# Patient Record
Sex: FEMALE | Race: WHITE | NOT HISPANIC OR LATINO | URBAN - METROPOLITAN AREA
[De-identification: names, ages, dates, MRNs, and addresses within clinical notes are randomized per-mention and may not be internally consistent; named-entity substitution may affect disease eponyms.]

---

## 2017-06-13 NOTE — PATIENT DISCUSSION
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 PRESCRIBE WARM COMPRESSES AND EYELID SCRUBS QD-BID, ARTIFICIAL TEARS BID-QID AND THE DAILY INTAKE OF OMEGA-3 FATTY ACIDS.

## 2017-06-13 NOTE — PATIENT DISCUSSION
Continue: PreserVision AREDS 2 (vit c,o-zh-njxkj-lutein-zeaxan): capsule: 157-338-77-4 mg-unit-mg-mg 1 capsule once a day by mouth

## 2017-06-13 NOTE — PATIENT DISCUSSION
CATARACT, OU - VISUALLY SIGNIFICANT. SCHEDULE PHACO WITH IOL OU IF VISUAL SYMPTOMS PERSIST. GLASSES RX GIVEN TO FILL IF DESIRES IN THE EVENT PATIENT DOES NOT PROCEED WITH SURGERY.

## 2017-06-23 NOTE — PATIENT DISCUSSION
Continue: PreserVision AREDS 2 (vit c,u-dd-mfgej-lutein-zeaxan): capsule: 114-118-30-3 mg-unit-mg-mg 1 capsule once a day by mouth

## 2017-07-21 NOTE — PATIENT DISCUSSION
Continue: PreserVision AREDS 2 (vit c,p-bc-nujhx-lutein-zeaxan): capsule: 788-412-37-8 mg-unit-mg-mg 1 capsule once a day by mouth

## 2017-07-21 NOTE — PATIENT DISCUSSION
Post-Op Instructions: The patient was instructed in the proper use of post-operative eye drops: Imprimis in the Surgical eye 3x a day for 3 weeks, then discontinue. Call back instructions, retinal detachment and endophthalmitis precautions given.

## 2017-08-11 NOTE — PATIENT DISCUSSION
Continue: PreserVision AREDS 2 (vit c,l-fo-egtkx-lutein-zeaxan): capsule: 746-190-36-6 mg-unit-mg-mg 1 capsule once a day by mouth

## 2017-08-11 NOTE — PATIENT DISCUSSION
S/P CATARACT SURGERY WITH IOL, OU. DOING WELL. CONTINUE IMPRIMIS BID OU FOR 1 MORE WEEK. SPECS RX OFFERED. RETURN FOR FOLLOW-UP IN 3 MONTHS FOR DFE OU OR SOONER IF NEEDED.

## 2017-09-08 NOTE — PATIENT DISCUSSION
Continue: PreserVision AREDS 2 (vit c,z-cp-rntrq-lutein-zeaxan): capsule: 846-572-73-1 mg-unit-mg-mg 1 capsule once a day by mouth

## 2017-09-08 NOTE — PATIENT DISCUSSION
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

## 2017-10-03 NOTE — PATIENT DISCUSSION
Continue: PreserVision AREDS 2 (vit c,p-aw-mcbzt-lutein-zeaxan): capsule: 654-115-88-8 mg-unit-mg-mg 1 capsule once a day by mouth

## 2017-10-03 NOTE — PATIENT DISCUSSION
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 DISCUSSED PUNCTAL PLUGS. THE PT WISHES TO PROCEED WITH THIS PROCEDURE TODAY. DROPPED WITH BETADINE 5% TODAY.

## 2017-11-06 NOTE — PATIENT DISCUSSION
Continue: PreserVision AREDS 2 (vit c,c-vc-qegcn-lutein-zeaxan): capsule: 416-745-36-4 mg-unit-mg-mg 1 capsule once a day by mouth

## 2017-11-06 NOTE — PATIENT DISCUSSION
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

## 2017-11-06 NOTE — PATIENT DISCUSSION
New Prescription: Angel Luis Schmidt (lifitegrast): dropperette: 5% 1 drop twice a day as directed into both eyes 11-

## 2018-02-20 NOTE — PATIENT DISCUSSION
DERMATOCHALASIS (UPPER LIDS), OU:  VISUALLY SIGNIFICANT TO PATIENT. WITH SKIN IRRITATION PRESCRIBED MAXITROL QHS X 2 WEEKS. REFER TO OCULOPLASTIC SPECIALIST.

## 2018-02-20 NOTE — PATIENT DISCUSSION
Continue: PreserVision AREDS 2 (vit c,t-sh-rbfbx-lutein-zeaxan): capsule: 349-158-07-0 mg-unit-mg-mg 1 capsule once a day by mouth

## 2018-02-20 NOTE — PATIENT DISCUSSION
Eyelid Lesion Biopsy Counseling: The patient has presented with one or more eyelid or facial lesions with growth, change, irritation or abnormal appearance. A biopsy was recommended, completed and sent to pathology for definitive diagnosis. The patient has been given post-procedure written instructions. The patient understands that the results of the pathology report will be available after one week to two weeks. The patient understands that an attempt to call and inform them of the result will be made within 2 weeks. The patient was instructed to call our office if they do not receive a call from us after two weeks.

## 2018-02-20 NOTE — PATIENT DISCUSSION
Continue: Marge Carr (lifitegrast): dropperette: 5% 1 drop twice a day as directed into both eyes 11-

## 2018-02-20 NOTE — PATIENT DISCUSSION
New Prescription: Maxitrol (neomycin-polymyxin-dexameth): ointment: 3.5-10,000-0.1 mg-unit/g-% 1 a small amount at bedtime into both eyes 02-

## 2018-04-27 NOTE — PATIENT DISCUSSION
PHOTOGRAPHS: I have reviewed the external ocular photographs of this patient which show the following: lesions on the left upper and left lower eyelids as well as significant ptosis of both upper eyelids.

## 2018-04-27 NOTE — PATIENT DISCUSSION
[place] Visual Field 36 point screen: I have reviewed the visual fields both taped and untaped on this patient which demonstrate significant obstruction of the patient's peripheral visual field on both eyes.

## 2018-04-27 NOTE — PATIENT DISCUSSION
The patient had a lesion on the left lower eyelid. After informed consent the lesion was anesthetized with local anesthetic, 1% lidocane with epinephrine 1:100,000 units. Sterile technique was used to remove the lesion with Salvador scissors. Antibiotic ointment was used to treat the area where lesion was removed. Lesion was sent to pathology for analysis. The patient was given written post operative wound care instructions and a prescription for antibiotic ointment. The patient was asked to call  within 10 days if they had not been otherwise called by our office with the result of the biopsy.

## 2018-04-27 NOTE — PATIENT DISCUSSION
The patient had a lesion on the left upper eyelid. After informed consent the lesion was anesthetized with local anesthetic, 1% lidocane with epinephrine 1:100,000 units. Sterile technique was used to remove the lesion with Salvador scissors. Antibiotic ointment was used to treat the area where lesion was removed. Lesion was sent to pathology for analysis. The patient was given written post operative wound care instructions and a prescription for antibiotic ointment. The patient was asked to call  within 10 days if they had not been otherwise called by our office with the result of the biopsy.

## 2018-07-27 NOTE — PATIENT DISCUSSION
Also, please do not hesitate to call us if you have any concerns not addressed by this information. Please call [phone] and we will do everything we can to help you during this period.

## 2018-09-18 NOTE — PATIENT DISCUSSION
BLEPHARITIS, OU: PRESCRIBE WARM COMPRESSES AND EYELID SCRUBS QD-BID, ARTIFICIAL TEARS BID-QID, THE DAILY INTAKE OF OMEGA-3 FATTY ACIDS AND TOBRADEX RACHELLE QHS X 2 WEEKS. W RETURN FOR FOLLOW-UP AS SCHEDULED.

## 2019-08-02 NOTE — PATIENT DISCUSSION
POSTERIOR CAPSULAR FIBROSIS, OU:  VISUALLY SIGNIFICANT. SCHEDULE YAG CAPSULOTOMY OS FIRST THEN LATER YAG CAPSULOTOMY OD  IF VISUAL SYMPTOMS PERSIST.

## 2019-08-02 NOTE — PATIENT DISCUSSION
Posterior Capsular Fibrosis Counseling: The diagnosis of posterior capsular fibrosis (PCF), also referred to as a secondary cataract or posterior capsular opacification (PCO), was discussed with the patient. The patient understands that their symptoms and limitations are likely related to this condition. I have reviewed the risks, benefits and alternatives of YAG laser surgery for the treatment of the fibrosis. The uncommon risk of an increase in intraocular pressure or a retinal detachment and their associated symptoms were explained to the patient. The patient understands and desires to proceed with the laser surgery to improve their vision. Scribe Attestation (For Scribes USE Only)... Attending Attestation (For Attendings USE Only).../Scribe Attestation (For Scribes USE Only)...

## 2019-08-02 NOTE — PATIENT DISCUSSION
BLEPHARITIS, OU: PRESCRIBE WARM COMPRESSES AND EYELID SCRUBS QD-BID, ARTIFICIAL TEARS BID-QID, THE DAILY INTAKE OF OMEGA-3 FATTY ACIDS AND TOBRADEX GTTS X 2 WEEKS. WILL RETURN FOR FOLLOW-UP AS SCHEDULED.

## 2019-08-02 NOTE — PATIENT DISCUSSION
New Prescription: TobraDex (tobramycin-dexamethasone): drops,suspension: 0.3-0.1% 1 drop twice a day as needed into both eyes 08-

## 2019-10-01 NOTE — PATIENT DISCUSSION
POST OP YAG OU FOR PCO. PATIENT DOING WELL WITHOUT ANY SIGNS OF RETINAL HOLES OR TEARS. NEW SPEC RX GIVEN.

## 2019-10-31 ENCOUNTER — IMPORTED ENCOUNTER (OUTPATIENT)
Dept: URBAN - METROPOLITAN AREA CLINIC 50 | Facility: CLINIC | Age: 79
End: 2019-10-31

## 2020-11-12 NOTE — PATIENT DISCUSSION
REFRACTIVE ERROR OU: UPDATED SPEC RX RELEASED TO PATIENT - DOCTORS CHANGE. RTC IF SI/SX PERSIST OR WORSEN.

## 2020-12-08 NOTE — PATIENT DISCUSSION
CHALAZION OD: PRESCRIBED WARM COMPRESSES, EYELID SCRUBS AND DOXYCYCLINE 100MG BID PO X 2 WEEKS AND MAXITROL RACHELLE QHS X 2 WEEKS. PT UNDERSTANDS SHE IS TO STOP VITAMINS WHILE ON COURSE OF DOXY.

## 2021-04-17 ASSESSMENT — TONOMETRY
OD_IOP_MMHG: 14
OS_IOP_MMHG: 14

## 2021-04-17 ASSESSMENT — VISUAL ACUITY
OD_BAT: 20/25
OS_PH: @ 17 IN
OS_BAT: 20/25
OS_OTHER: 20/25. 20/40.
OD_CC: J1-1@ 17 IN
OS_SC: 20/20
OS_CC: J1-1@ 17 IN
OD_PH: @ 17 IN
OD_SC: 20/20-1
OD_OTHER: 20/25. 20/40.

## 2021-08-31 NOTE — PATIENT DISCUSSION
MEIBOMIAN GLAND DYSFUNCTION, OU:  PRESCRIBE WARM COMPRESSES AND EYELID SCRUBS QD-BID, ARTIFICIAL TEARS BID-QID, THE DAILY INTAKE OF OMEGA-3 FATTY ACIDS  AND MAXITROL RACHELLE. WILL CONSIDER LIPIFLOW TREATMENT NEXT VISIT IF NOT RESPONSIVE TO TREATMENT OR IF SYMPTOMS PERSIST. RETURN FOR FOLLOW-UP AS SCHEDULED.

## 2021-10-26 NOTE — PATIENT DISCUSSION
Advised regular use of Amsler grid. Discussed AREDS supplements, BP Control, and dark leafy green vegetables. No treatment at this time - no clinical evidence of choroidal neovascularization seen on exam or OCT testing. Observation recommended. Return immediately with any changes in vision or on Amsler grid - we discussed that waiting can lead to irreversible vision loss and early injections may help save vision. Discussed UV blocking glasses and to avoid smoking as these may worsen disease process.

## 2021-10-29 ENCOUNTER — NEW PATIENT (OUTPATIENT)
Age: 81
End: 2021-10-29

## 2021-10-29 DIAGNOSIS — H18.593: ICD-10-CM

## 2021-10-29 DIAGNOSIS — H04.123: ICD-10-CM

## 2021-10-29 DIAGNOSIS — Z96.1: ICD-10-CM

## 2021-10-29 DIAGNOSIS — H43.813: ICD-10-CM

## 2021-10-29 PROCEDURE — 92004 COMPRE OPH EXAM NEW PT 1/>: CPT

## 2021-10-29 PROCEDURE — 92015 DETERMINE REFRACTIVE STATE: CPT | Mod: GY

## 2021-10-29 ASSESSMENT — KERATOMETRY
OD_K2POWER_DIOPTERS: 44.75
OD_K1POWER_DIOPTERS: 44.75
OS_AXISANGLE_DEGREES: 147
OD_AXISANGLE2_DEGREES: 90
OS_K2POWER_DIOPTERS: 43.75
OS_K1POWER_DIOPTERS: 43.25
OS_AXISANGLE2_DEGREES: 57
OD_AXISANGLE_DEGREES: 0

## 2021-10-29 ASSESSMENT — VISUAL ACUITY
OS_SC: 20/25+2
OD_SC: 20/20

## 2021-10-29 ASSESSMENT — TONOMETRY
OS_IOP_MMHG: 17
OD_IOP_MMHG: 19

## 2022-11-21 NOTE — PATIENT DISCUSSION
AMD (Dry) Counseling:  I have instructed the patient to take an AREDS 2 vitamin mixture to minimize the risk of disease progression. The importance of daily monitoring with Amsler grid was emphasized and an Amsler grid was provided if the patient did not have one. The patient was advised to call the office if new symptoms of persistent blurring or distortion of vision arise as evaluation and possible treatment is necessary to preserve as much vision as possible. Return for follow-up as scheduled. Anesthesia Volume In Cc (Will Not Render If 0): 0.5

## 2023-01-31 NOTE — PATIENT DISCUSSION
[facility] monitoring, AREDS2 vitamins, no smoking, green leafy vegetables discussed.

## 2023-07-06 NOTE — PATIENT DISCUSSION
Breonica Muñoz M.D. 

## 2023-10-12 NOTE — PATIENT DISCUSSION
TAKE WITH MEALS O-L Flap Text: The defect edges were debeveled with a #15 scalpel blade. Given the location of the defect, shape of the defect and the proximity to free margins an O-L flap was deemed most appropriate. Using a sterile surgical marker, an appropriate advancement flap was drawn incorporating the defect and placing the expected incisions within the relaxed skin tension lines where possible. The area thus outlined was incised deep to adipose tissue with a #15 scalpel blade. The skin margins were undermined to an appropriate distance in all directions utilizing iris scissors. Following this, the designed flap was advanced and carried over into the primary defect and sutured into place.

## 2024-02-29 NOTE — PATIENT DISCUSSION
PT DISCONTINUED XIIDRA BID OU DUE TO COST Pt was informed and stated she will call to schedule. I am not able to schedule.

## 2025-02-14 NOTE — PATIENT DISCUSSION
AMD (Dry) Counseling: I have instructed the patient to take an AREDS 2 vitamin mixture to minimize the risk of disease progression. The importance of daily monitoring with Amsler grid was emphasized and an Amsler grid was provided if the patient did not have one. The patient was advised to call the office if new symptoms of persistent blurring or distortion of vision arise as evaluation and possible treatment is necessary to preserve as much vision as possible. Return for follow-up as scheduled. 03-Feb-2025

## 2025-04-04 NOTE — PATIENT DISCUSSION
Patient understands condition, prognosis and need for follow up care. pt c/o squirrel bite to left hand finger